# Patient Record
Sex: MALE | Race: WHITE | Employment: FULL TIME | URBAN - METROPOLITAN AREA
[De-identification: names, ages, dates, MRNs, and addresses within clinical notes are randomized per-mention and may not be internally consistent; named-entity substitution may affect disease eponyms.]

---

## 2018-05-15 ENCOUNTER — OFFICE VISIT (OUTPATIENT)
Dept: FAMILY MEDICINE | Facility: CLINIC | Age: 33
End: 2018-05-15
Payer: COMMERCIAL

## 2018-05-15 VITALS
BODY MASS INDEX: 22.33 KG/M2 | SYSTOLIC BLOOD PRESSURE: 126 MMHG | RESPIRATION RATE: 14 BRPM | DIASTOLIC BLOOD PRESSURE: 74 MMHG | HEART RATE: 79 BPM | HEIGHT: 70 IN | WEIGHT: 156 LBS | OXYGEN SATURATION: 99 % | TEMPERATURE: 97.8 F

## 2018-05-15 DIAGNOSIS — R36.1 BLOOD IN SEMEN: ICD-10-CM

## 2018-05-15 DIAGNOSIS — Z11.3 SCREEN FOR STD (SEXUALLY TRANSMITTED DISEASE): ICD-10-CM

## 2018-05-15 PROCEDURE — 36415 COLL VENOUS BLD VENIPUNCTURE: CPT | Performed by: FAMILY MEDICINE

## 2018-05-15 PROCEDURE — 87340 HEPATITIS B SURFACE AG IA: CPT | Performed by: FAMILY MEDICINE

## 2018-05-15 PROCEDURE — 99203 OFFICE O/P NEW LOW 30 MIN: CPT | Performed by: FAMILY MEDICINE

## 2018-05-15 PROCEDURE — 87591 N.GONORRHOEAE DNA AMP PROB: CPT | Performed by: FAMILY MEDICINE

## 2018-05-15 PROCEDURE — 86780 TREPONEMA PALLIDUM: CPT | Performed by: FAMILY MEDICINE

## 2018-05-15 PROCEDURE — 87491 CHLMYD TRACH DNA AMP PROBE: CPT | Performed by: FAMILY MEDICINE

## 2018-05-15 PROCEDURE — 87389 HIV-1 AG W/HIV-1&-2 AB AG IA: CPT | Performed by: FAMILY MEDICINE

## 2018-05-15 PROCEDURE — 86803 HEPATITIS C AB TEST: CPT | Performed by: FAMILY MEDICINE

## 2018-05-15 NOTE — PROGRESS NOTES
"  SUBJECTIVE:   Raul Dominguez is a 33 year old male who presents to clinic today for the following health issues:      Patient is here for STD screening. He noticed blood in his semen this morning. Pt normally uses condoms but not over this weekend. This weekend he was at a concert and had more alcohol and not lots of water. No urethral discharge, fever, chills or dysuria. No history of STD.         Problem list and histories reviewed & adjusted, as indicated.  Additional history: as documented    Labs reviewed in EPIC    Reviewed and updated as needed this visit by clinical staff       Reviewed and updated as needed this visit by Provider         ROS:  Constitutional, HEENT, cardiovascular, pulmonary, gi and gu systems are negative, except as otherwise noted.    OBJECTIVE:     /74  Pulse 79  Temp 97.8  F (36.6  C) (Oral)  Resp 14  Ht 5' 10.25\" (1.784 m)  Wt 156 lb (70.8 kg)  SpO2 99%  BMI 22.22 kg/m2  Body mass index is 22.22 kg/(m^2).  GENERAL: healthy, alert and no distress  EYES: Eyes grossly normal to inspection  HENT: nose and mouth without ulcers or lesions  RESP: non labored   MS: no gross musculoskeletal defects noted  SKIN: no suspicious lesions or rashes  PSYCH: mentation appears normal, affect normal    Diagnostic Test Results:  none     ASSESSMENT/PLAN:     1. Blood in semen  - ordered below chlamydia/gonorrhea   - no UA needed as symptoms not c/w UTI    2. Screen for STD (sexually transmitted disease)  - advised to avoid intercourse until results are back   - Treponema Abs w Reflex to RPR and Titer  - NEISSERIA GONORRHOEA PCR  - CHLAMYDIA TRACHOMATIS PCR  - Hepatitis B surface antigen  - HIV Antigen Antibody Combo  - **Hepatitis C Screen Reflex to RNA FUTURE anytime    Deqcompa Mcelroy MD  [facility]  "

## 2018-05-15 NOTE — MR AVS SNAPSHOT
"              After Visit Summary   5/15/2018    Raul Dominguez    MRN: [id number]           Patient Information     Date Of Birth          1985        Visit Information        Provider Department      5/15/2018 2:40 PM Mariano Mcelroy MD [facility]        Today's Diagnoses     Screen for STD (sexually transmitted disease)    -  1       Follow-ups after your visit        Who to contact     If you have questions or need follow up information about today's clinic visit or your schedule please contact [facility] directly at [phone].  Normal or non-critical lab and imaging results will be communicated to you by MyChart, letter or phone within 4 business days after the clinic has received the results. If you do not hear from us within 7 days, please contact the clinic through Business e via [place]hart or phone. If you have a critical or abnormal lab result, we will notify you by phone as soon as possible.  Submit refill requests through Pfenex or call your pharmacy and they will forward the refill request to us. Please allow 3 business days for your refill to be completed.          Additional Information About Your Visit        MyChart Information     Pfenex lets you send messages to your doctor, view your test results, renew your prescriptions, schedule appointments and more. To sign up, go to [email or web address] . Click on \"Log in\" on the left side of the screen, which will take you to the Welcome page. Then click on \"Sign up Now\" on the right side of the page.     You will be asked to enter the access code listed below, as well as some personal information. Please follow the directions to create your username and password.     Your access code is: [email or web address]  Expires: 2018  2:49 PM     Your access code will  in 90 days. If you need help or a new code, please call your [facility] or [phone].        Care EveryWhere ID     This is your Care EveryWhere ID. This " "could be used by other organizations to access your [place] medical records  [id number]        Your Vitals Were     Pulse Temperature Respirations Height Pulse Oximetry BMI (Body Mass Index)    79 97.8  F (36.6  C) (Oral) 14 5' 10.25\" (1.784 m) 99% 22.22 kg/m2       Blood Pressure from Last 3 Encounters:   05/15/18 126/74   03/02/12 110/70    Weight from Last 3 Encounters:   05/15/18 156 lb (70.8 kg)   03/02/12 151 lb (68.5 kg)              We Performed the Following     **Hepatitis C Screen Reflex to RNA FUTURE anytime     CHLAMYDIA TRACHOMATIS PCR     Hepatitis B surface antigen     HIV Antigen Antibody Combo     NEISSERIA GONORRHOEA PCR     Treponema Abs w Reflex to RPR and Titer        Primary Care Provider Office Phone # Fax #    Deqa Teresa Mcelroy [phone]       [address]  [facility] [place] [address]        Equal Access to Services     [facility]: Hadii aad ku hadasho Soomaali, waaxda luqadaha, qaybta kaalmada adeegyada, waxay idiin hayaan cadeneg lexusararobi la'vishnu . So [facility] [phone].    ATENCIÓN: Si habla español, tiene a max disposición servicios gratuitos de asistencia lingüística. [facility] [phone].    We comply with applicable federal civil rights laws and [place] laws. We do not discriminate on the basis of race, color, national origin, age, disability, sex, sexual orientation, or gender identity.            Thank you!     Thank you for choosing [facility]  for your care. Our goal is always to provide you with excellent care. Hearing back from our patients is one way we can continue to improve our services. Please take a few minutes to complete the written survey that you may receive in the mail after your visit with us. Thank you!             Your Updated Medication List - Protect others around you: Learn how to safely use, store and throw away your medicines at www.disposemymeds.org.          This list is accurate as of 5/15/18  2:49 PM.  Always use your " most recent med list.                   Brand Name Dispense Instructions for use Diagnosis    imiquimod 5 % cream    ALDARA    12 packet    Apply  topically three times a week. Apply at bedtime to warts or molluscum.   Wash off after 8 hours.   May use for up to 16 weeks.    Molluscum contagiosum

## 2018-05-16 LAB
C TRACH DNA SPEC QL NAA+PROBE: NEGATIVE
HBV SURFACE AG SERPL QL IA: NONREACTIVE
HCV AB SERPL QL IA: NONREACTIVE
HIV 1+2 AB+HIV1 P24 AG SERPL QL IA: NONREACTIVE
N GONORRHOEA DNA SPEC QL NAA+PROBE: NEGATIVE
SPECIMEN SOURCE: NORMAL
SPECIMEN SOURCE: NORMAL
T PALLIDUM AB SER QL: NONREACTIVE

## 2018-06-15 ENCOUNTER — TELEPHONE (OUTPATIENT)
Dept: FAMILY MEDICINE | Facility: CLINIC | Age: 33
End: 2018-06-15

## 2018-06-15 DIAGNOSIS — Z11.3 SCREEN FOR STD (SEXUALLY TRANSMITTED DISEASE): ICD-10-CM

## 2018-06-15 DIAGNOSIS — Z11.3 SCREEN FOR STD (SEXUALLY TRANSMITTED DISEASE): Primary | ICD-10-CM

## 2018-06-15 PROCEDURE — 86695 HERPES SIMPLEX TYPE 1 TEST: CPT | Performed by: FAMILY MEDICINE

## 2018-06-15 PROCEDURE — 36415 COLL VENOUS BLD VENIPUNCTURE: CPT | Performed by: FAMILY MEDICINE

## 2018-06-15 PROCEDURE — 86696 HERPES SIMPLEX TYPE 2 TEST: CPT | Performed by: FAMILY MEDICINE

## 2018-06-15 NOTE — TELEPHONE ENCOUNTER
ASSESSMENT / PLAN:  (Z11.3) Screen for STD (sexually transmitted disease)  (primary encounter diagnosis)  Comment: all other std's already checked  I ordered future labs, see below  Plan: Herpes Simplex Virus 1 and 2 IgG        Please let patient know he can come in for lab visit anytime, thanks!  Sincerely,  Dr. Sharda Tucker MD  6/15/2018

## 2018-06-15 NOTE — TELEPHONE ENCOUNTER
Called and discussed with patient. Scheduled in the lab for today.    Christina Michaud, JOSÉN, RN  [facility]

## 2018-06-15 NOTE — TELEPHONE ENCOUNTER
Reason for Call: Request for an order or referral:    Order or referral being requested: Lab- Syphilis and herpes    Date needed: as soon as possible *Pt would like to have labs drawn today if possible*    Has the patient been seen by the PCP for this problem? YES, see 5/15/18    Additional comments:     Phone number Patient can be reached at:  Home number on file [phone] (home)    Best Time:      Can we leave a detailed message on this number?  YES    Call taken on 6/15/2018 at 8:52 AM by Silke Farias

## 2018-06-15 NOTE — TELEPHONE ENCOUNTER
Routing to covering providers while Dr. Cook is out -- please review pt request.    Pt was here 5/15/18 for STD check. Pt would like to be checked for herpes. (He requested syphilis also but note that treponema was negative 5/15/18.) He is not symptomatic, and he has not had known exposure.    Please review and advise.    Thank you  Christina Michaud, JOSÉN, RN  [facility]

## 2018-06-18 LAB
HSV1 IGG SERPL QL IA: <0.2 AI (ref 0–0.8)
HSV2 IGG SERPL QL IA: <0.2 AI (ref 0–0.8)

## 2018-06-20 NOTE — PROGRESS NOTES
Great news, your screen for herpes was negative, you do not have this infection!    Sincerely,  Dr. Sharda Tucker MD  6/20/2018

## 2018-10-24 ENCOUNTER — TELEPHONE (OUTPATIENT)
Dept: PSYCHIATRY | Facility: CLINIC | Age: 33
End: 2018-10-24

## 2018-10-24 NOTE — TELEPHONE ENCOUNTER
PSYCHIATRY CLINIC PHONE INTAKE     SERVICES REQUESTED / INTERESTED IN          Med Management    Presenting Problem and Brief History                              What would you like to be seen for? (brief description):  Pt was taking zoloft but hasn;t tajen any medication for the last 10 years. When he called he was feeling a lot more anxiety and depression while he was going thorugh a divorcde. At this time he has two therapist and feels better. He used to have panic attacks when he was longer but has learned how to cope.   Have you received a mental health diagnosis? Yes   Which one (s): Mild Depression and anxiety  Is there any history of developmental delay?  Yes   Are you currently seeing a mental health provider?  Yes            Who / month last seen:  [facility]  Do you have mental health records elsewhere?  Yes  Will you sign a release so we can obtain them?  Yes    Have you ever been hospitalized for psychiatric reasons?  No  Describe:  NA    Do you have current thoughts of self-harm?  No    Do you currently have thoughts of harming others?  No       Substance Use History     Do you have any history of alcohol / illicit drug use?  No  Describe:  NA  Have you ever received treatment for this?  No    Describe:  NA     Social History     Does the patient have a guardian?  No    Name / number: NA  Have you had an ACT team in last 12 months?  No  Describe: NA   Do you have any current or past legal issues?  No  Describe: NA   OK to leave a detailed voicemail?  Yes    Medical/ Surgical History                                   Patient Active Problem List   Diagnosis     CARDIOVASCULAR SCREENING; LDL GOAL LESS THAN 160     Screen for STD (sexually transmitted disease)          Medications             Current Outpatient Prescriptions   Medication Sig Dispense Refill     imiquimod (ALDARA) 5 % cream Apply  topically three times a week. Apply at bedtime to warts or molluscum.   Wash off  after 8 hours.   May use for up to 16 weeks. (Patient not taking: Reported on 5/15/2018) 12 packet 3         DISPOSITION      10/24/18 Intake completed. Pt stated he feels better and doesn't need to see someone at this time but he wanted to complete the intake. The writer informed him that if he wants to do med management, to call back and give an updated intake so he can be reviewed for scheduling.   Preethi White,

## 2018-11-28 ENCOUNTER — OFFICE VISIT (OUTPATIENT)
Dept: FAMILY MEDICINE | Facility: CLINIC | Age: 33
End: 2018-11-28
Payer: COMMERCIAL

## 2018-11-28 ENCOUNTER — TELEPHONE (OUTPATIENT)
Dept: FAMILY MEDICINE | Facility: CLINIC | Age: 33
End: 2018-11-28

## 2018-11-28 VITALS
TEMPERATURE: 98.2 F | SYSTOLIC BLOOD PRESSURE: 118 MMHG | BODY MASS INDEX: 21.73 KG/M2 | HEART RATE: 73 BPM | DIASTOLIC BLOOD PRESSURE: 72 MMHG | WEIGHT: 152.5 LBS | OXYGEN SATURATION: 100 %

## 2018-11-28 DIAGNOSIS — Z01.818 PREOP GENERAL PHYSICAL EXAM: Primary | ICD-10-CM

## 2018-11-28 DIAGNOSIS — G56.23 CUBITAL TUNNEL SYNDROME, BILATERAL: ICD-10-CM

## 2018-11-28 PROCEDURE — 99214 OFFICE O/P EST MOD 30 MIN: CPT | Performed by: FAMILY MEDICINE

## 2018-11-28 NOTE — PROGRESS NOTES
[place] [id number] [place] [id number] [phone]  Dept: [phone]    PRE-OP EVALUATION:  Today's date: 2018    Raul Dominguez (: 1985) presents for pre-operative evaluation assessment as requested by Dr. Dr Xavier.  He requires evaluation and anesthesia risk assessment prior to undergoing surgery/procedure for treatment of cubital tunnel release of both hands  .    Fax number for surgical facility: [facility], [facility].     Primary Physician: Mariano Mcelroy  Type of Anesthesia Anticipated: unknown    Patient has a Health Care Directive or Living Will:  NO    Preop Questions 2018   Who is doing your surgery? Dr. Xavier   What are you having done? [facility]   Date of Surgery/Procedure: 2018 ulnar nerv both arms   Facility or Hospital where procedure/surgery will be performed: [place]   1.  Do you have a history of Heart attack, stroke, stent, coronary bypass surgery, or other heart surgery? No   2.  Do you ever have any pain or discomfort in your chest? No   3.  Do you have a history of  Heart Failure? No   4.   Are you troubled by shortness of breath when:  walking on a level surface, or up a slight hill, or at night? No   5.  Do you currently have a cold, bronchitis or other respiratory infection? No   6.  Do you have a cough, shortness of breath, or wheezing? No   7.  Do you sometimes get pains in the calves of your legs when you walk? No   8. Do you or anyone in your family have previous history of blood clots? No   9.  Do you or does anyone in your family have a serious bleeding problem such as prolonged bleeding following surgeries or cuts? No   10. Have you ever had problems with anemia or been told to take iron pills? No   11. Have you had any abnormal blood loss such as black, tarry or bloody stools? No   12. Have you ever had a blood transfusion? No   13. Have you or any of  your relatives ever had problems with anesthesia? No   14. Do you have sleep apnea, excessive snoring or daytime drowsiness? No   15. Do you have any prosthetic heart valves? No   16. Do you have prosthetic joints? No         HPI:     HPI related to upcoming procedure: bilateral cubital tunnel syndrome - numbness/tingling and pain in the 3&4th digits      See problem list for active medical problems.  Problems all longstanding and stable, except as noted/documented.  See ROS for pertinent symptoms related to these conditions.                                                                                                                                                          .    MEDICAL HISTORY:     Patient Active Problem List    Diagnosis Date Noted     Screen for STD (sexually transmitted disease) 06/15/2018     Priority: Medium     CARDIOVASCULAR SCREENING; LDL GOAL LESS THAN 160 03/03/2012     Priority: Medium      PMH - reviewed, no significant PMH  PSH - adenomectomy, left 2nd digit surgery    Medications - none   OTC products: None, except as noted above    Allergies   Allergen Reactions     Levaquin      Joint pn      Latex Allergy: NO    Social History   Substance Use Topics     Smoking status: Never Smoker     Smokeless tobacco: Never Used     Alcohol use Yes     History   Drug Use No       REVIEW OF SYSTEMS:   CONSTITUTIONAL: NEGATIVE for fever, chills, change in weight  INTEGUMENTARY/SKIN: NEGATIVE for worrisome rashes, moles or lesions  EYES: NEGATIVE for vision changes or irritation  ENT/MOUTH: NEGATIVE for ear, mouth and throat problems  RESP: NEGATIVE for significant cough or SOB  BREAST: NEGATIVE for masses, tenderness or discharge  CV: NEGATIVE for chest pain, palpitations or peripheral edema  GI: NEGATIVE for nausea, abdominal pain, heartburn, or change in bowel habits  : NEGATIVE for frequency, dysuria, or hematuria  MUSCULOSKELETAL: see above   NEURO: see above   ENDOCRINE: NEGATIVE for  temperature intolerance, skin/hair changes  HEME: NEGATIVE for bleeding problems  PSYCHIATRIC: NEGATIVE for changes in mood or affect    EXAM:   /72  Pulse 73  Temp 98.2  F (36.8  C) (Oral)  Wt 152 lb 8 oz (69.2 kg)  SpO2 100%  BMI 21.73 kg/m2    GENERAL APPEARANCE: healthy, alert and no distress     EYES: EOMI,  PERRL     HENT:  nose and mouth without ulcers or lesions     NECK: no adenopathy, no asymmetry, masses, or scars and thyroid normal to palpation     RESP: lungs clear to auscultation - no rales, rhonchi or wheezes     CV: regular rates and rhythm, normal S1 S2     ABDOMEN:  soft, nontender, no HSM or masses and bowel sounds normal     SKIN: no suspicious lesions or rashes     NEURO: Normal strength and tone, sensory exam grossly normal, mentation intact and speech normal     PSYCH: mentation appears normal. and affect normal/bright     LYMPHATICS: No cervical adenopathy    DIAGNOSTICS:   EKG: Not indicated due to non-vascular surgery and low risk of event (age <65 and without cardiac risk factors)  Hemoglobin (indicated for history of anemia or procedure with significant blood loss such as tonsillectomy, major intraperitoneal surgery, vascular surgery, major spine surgery, total joint replacement)  IMPRESSION:   Reason for surgery/procedure: cubital tunnel syndrome   Diagnosis/reason for consult: pre-op     The proposed surgical procedure is considered INTERMEDIATE risk.    REVISED CARDIAC RISK INDEX  The patient has the following serious cardiovascular risks for perioperative complications such as (MI, PE, VFib and 3  AV Block):  No serious cardiac risks  INTERPRETATION: 0 risks: Class I (very low risk - 0.4% complication rate)    The patient has the following additional risks for perioperative complications:  No identified additional risks      ICD-10-CM    1. Preop general physical exam Z01.818        RECOMMENDATIONS:     --Consult hospital rounder / IM to assist post-op medical management if  overnight     --Patient is to hold all medications on the day of surgery.    No Advil or Aleve 3 days before surgery.  No Aspirin 7 days before surgery.      APPROVAL GIVEN to proceed with proposed procedure, without further diagnostic evaluation       Signed Electronically by: Mariano Mcelroy MD    Copy of this evaluation report is provided to requesting physician.    [place] Preop Guidelines    Revised Cardiac Risk Index

## 2018-11-28 NOTE — PATIENT INSTRUCTIONS
No Advil or Aleve 3 days before surgery.  No Aspirin 7 days before surgery.  Hold all medications on the day of the surgery.         Before Your Surgery      Call your surgeon if there is any change in your health. This includes signs of a cold or flu (such as a sore throat, runny nose, cough, rash or fever).    Do not smoke, drink alcohol or take over the counter medicine (unless your surgeon or primary care doctor tells you to) for the 24 hours before and after surgery.    If you take prescribed drugs: Follow your doctor s orders about which medicines to take and which to stop until after surgery.    Eating and drinking prior to surgery: follow the instructions from your surgeon    Take a shower or bath the night before surgery. Use the soap your surgeon gave you to gently clean your skin. If you do not have soap from your surgeon, use your regular soap. Do not shave or scrub the surgery site.  Wear clean pajamas and have clean sheets on your bed.

## 2018-11-28 NOTE — MR AVS SNAPSHOT
After Visit Summary   11/28/2018    Raul Dominguez    MRN: [id number]           Patient Information     Date Of Birth          1985        Visit Information        Provider Department      11/28/2018 11:40 AM Mariano Mcelroy MD [facility]        Today's Diagnoses     Preop general physical exam    -  1    Cubital tunnel syndrome, bilateral          Care Instructions    No Advil or Aleve 3 days before surgery.  No Aspirin 7 days before surgery.  Hold all medications on the day of the surgery.         Before Your Surgery      Call your surgeon if there is any change in your health. This includes signs of a cold or flu (such as a sore throat, runny nose, cough, rash or fever).    Do not smoke, drink alcohol or take over the counter medicine (unless your surgeon or primary care doctor tells you to) for the 24 hours before and after surgery.    If you take prescribed drugs: Follow your doctor s orders about which medicines to take and which to stop until after surgery.    Eating and drinking prior to surgery: follow the instructions from your surgeon    Take a shower or bath the night before surgery. Use the soap your surgeon gave you to gently clean your skin. If you do not have soap from your surgeon, use your regular soap. Do not shave or scrub the surgery site.  Wear clean pajamas and have clean sheets on your bed.           Follow-ups after your visit        Who to contact     If you have questions or need follow up information about today's clinic visit or your schedule please contact [facility] directly at [phone].  Normal or non-critical lab and imaging results will be communicated to you by MyChart, letter or phone within 4 business days after the clinic has received the results. If you do not hear from us within 7 days, please contact the clinic through MyChart or phone. If you have a critical or abnormal lab result, we will notify you by phone as  soon as possible.  Submit refill requests through Kofax or call your pharmacy and they will forward the refill request to us. Please allow 3 business days for your refill to be completed.          Additional Information About Your Visit        [email or web address] Information     Kofax gives you secure access to your electronic health record. If you see a primary care provider, you can also send messages to your care team and make appointments. If you have questions, please call your primary care clinic.  If you do not have a primary care provider, please call [phone] and they will assist you.        Care EveryWhere ID     This is your Care EveryWhere ID. This could be used by other organizations to access your [place] medical records  [id number]        Your Vitals Were     Pulse Temperature Pulse Oximetry BMI (Body Mass Index)          73 98.2  F (36.8  C) (Oral) 100% 21.73 kg/m2         Blood Pressure from Last 3 Encounters:   11/28/18 118/72   05/15/18 126/74   03/02/12 110/70    Weight from Last 3 Encounters:   11/28/18 152 lb 8 oz (69.2 kg)   05/15/18 156 lb (70.8 kg)   03/02/12 151 lb (68.5 kg)              Today, you had the following     No orders found for display         Today's Medication Changes          These changes are accurate as of 11/28/18 12:03 PM.  If you have any questions, ask your nurse or doctor.               Stop taking these medicines if you haven't already. Please contact your care team if you have questions.     imiquimod 5 % external cream   Commonly known as:  ALDARA   Stopped by:  Mariano Mcelroy MD                    Primary Care Provider Office Phone # Fax #    Mariano Mcelroy [phone] [address]  [facility] [address]        Equal Access to Services     JAYLEEN RINALDI : Felicity Burnham, penny arboleda, elisa tafoya. So [facility] [phone].    ATENCIÓN: Si kate myrick  a max disposición servicios gratuitos de asistencia lingüística. Tenzin corral [phone].    We comply with applicable federal civil rights laws and [place] laws. We do not discriminate on the basis of race, color, national origin, age, disability, sex, sexual orientation, or gender identity.            Thank you!     Thank you for choosing [facility]  for your care. Our goal is always to provide you with excellent care. Hearing back from our patients is one way we can continue to improve our services. Please take a few minutes to complete the written survey that you may receive in the mail after your visit with us. Thank you!             Your Updated Medication List - Protect others around you: Learn how to safely use, store and throw away your medicines at www.disposemymeds.org.      Notice  As of 11/28/2018 12:03 PM    You have not been prescribed any medications.

## 2019-11-06 ENCOUNTER — HEALTH MAINTENANCE LETTER (OUTPATIENT)
Age: 34
End: 2019-11-06

## 2019-11-18 ENCOUNTER — OFFICE VISIT (OUTPATIENT)
Dept: FAMILY MEDICINE | Facility: CLINIC | Age: 34
End: 2019-11-18
Payer: COMMERCIAL

## 2019-11-18 ENCOUNTER — ANCILLARY PROCEDURE (OUTPATIENT)
Dept: GENERAL RADIOLOGY | Facility: CLINIC | Age: 34
End: 2019-11-18
Attending: NURSE PRACTITIONER
Payer: COMMERCIAL

## 2019-11-18 ENCOUNTER — ANCILLARY PROCEDURE (OUTPATIENT)
Dept: CT IMAGING | Facility: CLINIC | Age: 34
End: 2019-11-18
Attending: NURSE PRACTITIONER
Payer: COMMERCIAL

## 2019-11-18 DIAGNOSIS — S09.90XA INJURY OF HEAD, INITIAL ENCOUNTER: ICD-10-CM

## 2019-11-18 DIAGNOSIS — S09.90XA INJURY OF HEAD, INITIAL ENCOUNTER: Primary | ICD-10-CM

## 2019-11-18 DIAGNOSIS — M25.531 RIGHT WRIST PAIN: ICD-10-CM

## 2019-11-18 DIAGNOSIS — S06.0X9A CONCUSSION WITH LOSS OF CONSCIOUSNESS, INITIAL ENCOUNTER: ICD-10-CM

## 2019-11-18 PROCEDURE — 73110 X-RAY EXAM OF WRIST: CPT | Mod: RT

## 2019-11-18 PROCEDURE — 99214 OFFICE O/P EST MOD 30 MIN: CPT | Performed by: NURSE PRACTITIONER

## 2019-11-18 ASSESSMENT — MIFFLIN-ST. JEOR: SCORE: 1696.96
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
## 2019-11-18 NOTE — LETTER
November 18, 2019      Raul STINSON Angelica  [address]  [facility] [address]        To Whom It May Concern,      Raul was seen in clinic today.  He is unable to work for at least the next week.  He will be re-evaluated in one week.           Sincerely,        Radha Armstrong, DNP, CNP

## 2019-11-25 ENCOUNTER — OFFICE VISIT (OUTPATIENT)
Dept: FAMILY MEDICINE | Facility: CLINIC | Age: 34
End: 2019-11-25
Payer: COMMERCIAL

## 2019-11-25 DIAGNOSIS — S09.90XD INJURY OF HEAD, SUBSEQUENT ENCOUNTER: Primary | ICD-10-CM

## 2019-11-25 DIAGNOSIS — S06.0X9D CONCUSSION WITH LOSS OF CONSCIOUSNESS, SUBSEQUENT ENCOUNTER: ICD-10-CM

## 2019-11-25 PROCEDURE — 99213 OFFICE O/P EST LOW 20 MIN: CPT | Performed by: NURSE PRACTITIONER

## 2019-11-25 ASSESSMENT — PATIENT HEALTH QUESTIONNAIRE - PHQ9
SUM OF ALL RESPONSES TO PHQ QUESTIONS 1-9: 13
5. POOR APPETITE OR OVEREATING: MORE THAN HALF THE DAYS

## 2019-11-25 ASSESSMENT — ANXIETY QUESTIONNAIRES
1. FEELING NERVOUS, ANXIOUS, OR ON EDGE: MORE THAN HALF THE DAYS
7. FEELING AFRAID AS IF SOMETHING AWFUL MIGHT HAPPEN: SEVERAL DAYS
GAD7 TOTAL SCORE: 11
3. WORRYING TOO MUCH ABOUT DIFFERENT THINGS: MORE THAN HALF THE DAYS
IF YOU CHECKED OFF ANY PROBLEMS ON THIS QUESTIONNAIRE, HOW DIFFICULT HAVE THESE PROBLEMS MADE IT FOR YOU TO DO YOUR WORK, TAKE CARE OF THINGS AT HOME, OR GET ALONG WITH OTHER PEOPLE: SOMEWHAT DIFFICULT
6. BECOMING EASILY ANNOYED OR IRRITABLE: MORE THAN HALF THE DAYS
5. BEING SO RESTLESS THAT IT IS HARD TO SIT STILL: SEVERAL DAYS
2. NOT BEING ABLE TO STOP OR CONTROL WORRYING: SEVERAL DAYS

## 2019-11-25 ASSESSMENT — MIFFLIN-ST. JEOR: SCORE: 1696.96
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
## 2019-11-25 NOTE — LETTER
November 25, 2019      Raul STINSON Angelica  [address]  [facility] [facility] [address]        To Whom It May Concern,      Raul was seen in clinic today.  He is unable to work for at least the next two weeks.  He will be re-evaluated on 12/9/19.          Sincerely,        Radha Armstrong, JERRELL, CNP

## 2019-11-26 ASSESSMENT — ANXIETY QUESTIONNAIRES: GAD7 TOTAL SCORE: 11

## 2019-12-09 ENCOUNTER — OFFICE VISIT (OUTPATIENT)
Dept: FAMILY MEDICINE | Facility: CLINIC | Age: 34
End: 2019-12-09
Payer: COMMERCIAL

## 2019-12-09 VITALS
WEIGHT: 164 LBS | OXYGEN SATURATION: 98 % | HEART RATE: 63 BPM | SYSTOLIC BLOOD PRESSURE: 105 MMHG | BODY MASS INDEX: 23.36 KG/M2 | DIASTOLIC BLOOD PRESSURE: 64 MMHG | RESPIRATION RATE: 18 BRPM | TEMPERATURE: 97.7 F

## 2019-12-09 DIAGNOSIS — S06.0X9D CONCUSSION WITH LOSS OF CONSCIOUSNESS, SUBSEQUENT ENCOUNTER: Primary | ICD-10-CM

## 2019-12-09 PROCEDURE — 99213 OFFICE O/P EST LOW 20 MIN: CPT | Performed by: NURSE PRACTITIONER

## 2019-12-09 NOTE — LETTER
December 9, 2019      Raul STINSON Angelica  [address]  [place] [facility] [address]        To Whom It May Concern,      Raul was seen in clinic today.  He is able to return to work on 12/10, working 4 hours a day until 12/13 and may return to work without any restrictions on 12/16/19.          Sincerely,        Radha Armstrong, DNP, CNP
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
## 2019-12-10 ENCOUNTER — TELEPHONE (OUTPATIENT)
Dept: FAMILY MEDICINE | Facility: CLINIC | Age: 34
End: 2019-12-10

## 2019-12-11 NOTE — TELEPHONE ENCOUNTER
Completed by PCP.   Called pt: okay to fax to [employer], pt requesting copy emailed to him, also made a copy for abstraction.    Lianet Hendricks MA

## 2019-12-20 ENCOUNTER — TELEPHONE (OUTPATIENT)
Dept: FAMILY MEDICINE | Facility: CLINIC | Age: 34
End: 2019-12-20

## 2019-12-26 NOTE — TELEPHONE ENCOUNTER
Form faxed. Placed original in abstract, made a copy and placed in provider's faxed bin and mailed a copy to patient's address.    Tiana Beckman MA

## 2019-12-27 ENCOUNTER — TELEPHONE (OUTPATIENT)
Dept: FAMILY MEDICINE | Facility: CLINIC | Age: 34
End: 2019-12-27

## 2019-12-27 NOTE — TELEPHONE ENCOUNTER
Received FMLA form via fax. Form that was faxed in is the same form from the previous one that Radha Armstrong filled out. Emailed form to patient.      Tiana Beckman MA

## 2020-01-23 ENCOUNTER — MYC MEDICAL ADVICE (OUTPATIENT)
Dept: FAMILY MEDICINE | Facility: CLINIC | Age: 35
End: 2020-01-23

## 2020-01-24 NOTE — TELEPHONE ENCOUNTER
Printed form and placed in provider's records form to review.         Arielle RENNER     [facility]

## 2020-01-27 NOTE — TELEPHONE ENCOUNTER
It appears they are wanting records - they previously requested and never received them.  Please send ASAP.

## 2020-01-28 ENCOUNTER — DOCUMENTATION ONLY (OUTPATIENT)
Dept: FAMILY MEDICINE | Facility: CLINIC | Age: 35
End: 2020-01-28

## 2020-01-28 NOTE — PROGRESS NOTES
Reason for call:  Form   Our goal is to have forms completed within 72 hours, however some forms may require a visit or additional information.     Who is the form from? Patient  Where did the form come from? form was faxed in  What clinic location was the form placed at?   Where was the form placed? BOX Box/Folder  What number is listed as a contact on the form? [phone]    Phone call message - patient request for a letter, form or note:     Date needed: as soon as possible  Please call the patient when completed  Has the patient signed a consent form for release of information? Not Applicable    Additional comments:     Type of letter, form or note: [facility]    Phone number to reach patient:  Cell number on file:    Telephone Information:   Mobile [phone]       Best Time:      Can we leave a detailed message on this number?

## 2020-01-29 NOTE — PROGRESS NOTES
Stamped, made copy for chart, and emailed patient back.    Gave patient contact information for medical records and attached JAYE.     Lianet Hendricks MA

## 2020-11-29 ENCOUNTER — HEALTH MAINTENANCE LETTER (OUTPATIENT)
Age: 35
End: 2020-11-29

## 2020-12-20 ENCOUNTER — APPOINTMENT (OUTPATIENT)
Dept: GENERAL RADIOLOGY | Facility: CLINIC | Age: 35
End: 2020-12-20
Attending: INTERNAL MEDICINE
Payer: COMMERCIAL

## 2020-12-20 ENCOUNTER — HOSPITAL ENCOUNTER (EMERGENCY)
Facility: CLINIC | Age: 35
Discharge: HOME | End: 2020-12-20
Attending: INTERNAL MEDICINE | Admitting: INTERNAL MEDICINE
Payer: COMMERCIAL

## 2020-12-20 VITALS
SYSTOLIC BLOOD PRESSURE: 129 MMHG | RESPIRATION RATE: 18 BRPM | HEART RATE: 78 BPM | TEMPERATURE: 98 F | OXYGEN SATURATION: 100 % | DIASTOLIC BLOOD PRESSURE: 71 MMHG

## 2020-12-20 DIAGNOSIS — S93.601A SPRAIN OF RIGHT FOOT, INITIAL ENCOUNTER: ICD-10-CM

## 2020-12-20 DIAGNOSIS — S29.011A MUSCLE STRAIN OF CHEST WALL, INITIAL ENCOUNTER: ICD-10-CM
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
## 2020-12-20 PROCEDURE — 99284 EMERGENCY DEPT VISIT MOD MDM: CPT | Performed by: INTERNAL MEDICINE

## 2020-12-20 PROCEDURE — 99284 EMERGENCY DEPT VISIT MOD MDM: CPT | Mod: 25 | Performed by: INTERNAL MEDICINE

## 2020-12-20 PROCEDURE — 71046 X-RAY EXAM CHEST 2 VIEWS: CPT

## 2020-12-20 PROCEDURE — 73630 X-RAY EXAM OF FOOT: CPT | Mod: LT

## 2020-12-20 RX ORDER — NAPROXEN 500 MG/1
500 TABLET ORAL 2 TIMES DAILY PRN
Qty: 30 TABLET | Refills: 0 | Status: SHIPPED | OUTPATIENT
Start: 2020-12-20 | End: 2021-12-23

## 2020-12-20 RX ORDER — CYCLOBENZAPRINE HCL 5 MG
5 TABLET ORAL 3 TIMES DAILY PRN
Qty: 20 TABLET | Refills: 0 | Status: SHIPPED | OUTPATIENT
Start: 2020-12-20 | End: 2021-12-23

## 2020-12-20 ASSESSMENT — ENCOUNTER SYMPTOMS
ABDOMINAL PAIN: 0
ARTHRALGIAS: 0
CONFUSION: 0
NECK PAIN: 0
NECK STIFFNESS: 0
COLOR CHANGE: 0
DIFFICULTY URINATING: 0
FEVER: 0
EYE REDNESS: 0
NUMBNESS: 1
HEADACHES: 0
SHORTNESS OF BREATH: 0
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
## 2020-12-20 NOTE — ED AVS SNAPSHOT
[facility] Emergency Department  [address]  [place] [address]  Phone: [phone]  Fax: [phone]                                    Raul Dominguez   MRN: [id number]    Department: [facility] Emergency Department   Date of Visit: 12/20/2020           After Visit Summary Signature Page    I have received my discharge instructions, and my questions have been answered. I have discussed any challenges I see with this plan with the nurse or doctor.    ..........................................................................................................................................  Patient/Patient Representative Signature      ..........................................................................................................................................  Patient Representative Print Name and Relationship to Patient    ..................................................               ................................................  Date                                   Time    ..........................................................................................................................................  Reviewed by Signature/Title    ...................................................              ..............................................  Date                                               Time          22EPIC Rev 08/18
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
## 2021-09-19 ENCOUNTER — HEALTH MAINTENANCE LETTER (OUTPATIENT)
Age: 36
End: 2021-09-19

## 2021-12-23 ENCOUNTER — OFFICE VISIT (OUTPATIENT)
Dept: FAMILY MEDICINE | Facility: CLINIC | Age: 36
End: 2021-12-23
Payer: COMMERCIAL

## 2021-12-23 VITALS
WEIGHT: 173 LBS | BODY MASS INDEX: 24.22 KG/M2 | HEART RATE: 83 BPM | DIASTOLIC BLOOD PRESSURE: 83 MMHG | SYSTOLIC BLOOD PRESSURE: 128 MMHG | TEMPERATURE: 99.3 F | HEIGHT: 71 IN | OXYGEN SATURATION: 99 %

## 2021-12-23 DIAGNOSIS — F41.9 ANXIETY AND DEPRESSION: ICD-10-CM

## 2021-12-23 DIAGNOSIS — Z01.818 PREOP GENERAL PHYSICAL EXAM: Primary | ICD-10-CM

## 2021-12-23 DIAGNOSIS — F32.A ANXIETY AND DEPRESSION: ICD-10-CM

## 2021-12-23 DIAGNOSIS — G56.21 ULNAR TUNNEL SYNDROME OF RIGHT WRIST: ICD-10-CM

## 2021-12-23 LAB
ERYTHROCYTE [DISTWIDTH] IN BLOOD BY AUTOMATED COUNT: 12.2 % (ref 10–15)
HCT VFR BLD AUTO: 44.7 % (ref 40–53)
HGB BLD-MCNC: 15.3 G/DL (ref 13.3–17.7)
MCH RBC QN AUTO: 32.9 PG (ref 26.5–33)
MCHC RBC AUTO-ENTMCNC: 34.2 G/DL (ref 31.5–36.5)
MCV RBC AUTO: 96 FL (ref 78–100)
PLATELET # BLD AUTO: 260 10E3/UL (ref 150–450)
RBC # BLD AUTO: 4.65 10E6/UL (ref 4.4–5.9)
WBC # BLD AUTO: 6.3 10E3/UL (ref 4–11)

## 2021-12-23 PROCEDURE — 85027 COMPLETE CBC AUTOMATED: CPT | Performed by: PHYSICIAN ASSISTANT

## 2021-12-23 PROCEDURE — 80048 BASIC METABOLIC PNL TOTAL CA: CPT | Performed by: PHYSICIAN ASSISTANT

## 2021-12-23 PROCEDURE — 36415 COLL VENOUS BLD VENIPUNCTURE: CPT | Performed by: PHYSICIAN ASSISTANT

## 2021-12-23 PROCEDURE — 99214 OFFICE O/P EST MOD 30 MIN: CPT | Performed by: PHYSICIAN ASSISTANT

## 2021-12-23 ASSESSMENT — MIFFLIN-ST. JEOR: SCORE: 1736.85

## 2021-12-23 ASSESSMENT — PAIN SCALES - GENERAL: PAINLEVEL: NO PAIN (0)

## 2021-12-23 NOTE — RESULT ENCOUNTER NOTE
Dear Raul,     Here are your recent results which are within the expected range. Please continue with your current plan of care and let us know if you have any questions or concerns.    Regards,  Ronna Sands PA-C

## 2021-12-23 NOTE — PATIENT INSTRUCTIONS
Stop all NSAIDs (motrin, ibuprofen, naproxen, aleve, advil, naprosyn, aspirin)  for at least 5 days prior to surgery.    Stop Elverta's wort 5 days prior to surgery.    Okay to take medical cannabis the evening prior to surgery.

## 2021-12-23 NOTE — PROGRESS NOTES
[address], [address]  [facility] [address]  Phone: [phone]  Primary Provider: Mariano Mcelroy  Pre-op Performing Provider: ROCKY ULRICH      PREOPERATIVE EVALUATION:  Today's date: 12/23/2021    Raul Dominguez is a 36 year old male who presents for a preoperative evaluation.    Surgical Information:  Surgery/Procedure: Right ulnar orbital tunnel release  Surgery Location: [facility]  Surgeon: Edgar  Surgery Date: 12/29/2021  Time of Surgery: 2:00 PM  Where patient plans to recover: At home with family  Fax number for surgical facility: [phone]    Type of Anesthesia Anticipated: Local with MAC    Assessment & Plan     The proposed surgical procedure is considered LOW risk.    Assessment and Plan:     (Z01.818) Preop general physical exam  (primary encounter diagnosis)  Comment: able to do 4 METs easily  Plan: Basic metabolic panel  (Ca, Cl, CO2, Creat,         Gluc, K, Na, BUN), CBC with platelets  Cleared for surgery    (G56.21) Ulnar tunnel syndrome of right wrist  Comment: numbness and tingling x years  Plan: per ortho    (F41.9,  F32.A) Anxiety and depression  Comment: stable on Toole wort and medical cannabis  Plan: hold St. J wort x 5 days prior to surgery    Risks and Recommendations:  The patient has the following additional risks and recommendations for perioperative complications:   - No identified additional risk factors other than previously addressed    Medication Instructions:  Stop all NSAIDs (motrin, ibuprofen, naproxen, aleve, advil, naprosyn, aspirin)  for at least 5 days prior to surgery.    Stop David's wort 5 days prior to surgery.    Okay to take medical cannabis the evening prior to surgery.    RECOMMENDATION:  APPROVAL GIVEN to proceed with proposed procedure, without further diagnostic evaluation.    Rocky Sands PA-C      Subjective     HPI related to upcoming procedure:   Will have  right ulnar tunnel release on 12/27/21 with Dr. Hernández.    Has had right hand symptoms x years: numbness/tingling.    He otherwise feels well.    He exercises regularly--lifts twice a week, hike and bikes 2-3 x per week.  No chest pain or sob with exercise    He denies cardiac history.    He also denies fever/chills, cough, congestion, chest pain, sob, abdominal pain, nausea/vomiting, diarrhea, black/bloody stool, dysuria, dizziness, headache.      Preop Questions 12/23/2021   1. Have you ever had a heart attack or stroke? No   2. Have you ever had surgery on your heart or blood vessels, such as a stent placement, a coronary artery bypass, or surgery on an artery in your head, neck, heart, or legs? No   3. Do you have chest pain with activity? No   4. Do you have a history of  heart failure? No   5. Do you currently have a cold, bronchitis or symptoms of other infection? No   6. Do you have a cough, shortness of breath, or wheezing? No   7. Do you or anyone in your family have previous history of blood clots? No   8. Do you or does anyone in your family have a serious bleeding problem such as prolonged bleeding following surgeries or cuts? No   9. Have you ever had problems with anemia or been told to take iron pills? No   10. Have you had any abnormal blood loss such as black, tarry or bloody stools? No   11. Have you ever had a blood transfusion? No   12. Are you willing to have a blood transfusion if it is medically needed before, during, or after your surgery? Yes   13. Have you or any of your relatives ever had problems with anesthesia? No   14. Do you have sleep apnea, excessive snoring or daytime drowsiness? No   15. Do you have any artifical heart valves or other implanted medical devices like a pacemaker, defibrillator, or continuous glucose monitor? No   16. Do you have artificial joints? No   17. Are you allergic to latex? No     Health Care Directive:  Patient does not have a Health Care Directive or  "Living Will: Discussed advance care planning with patient; information given to patient to review.      Review of Systems  Constitutional, neuro, ENT, endocrine, pulmonary, cardiac, gastrointestinal, genitourinary, musculoskeletal, integument and psychiatric systems are negative, except as otherwise noted.    Patient Active Problem List    Diagnosis Date Noted     Screen for STD (sexually transmitted disease) 06/15/2018     Priority: Medium     CARDIOVASCULAR SCREENING; LDL GOAL LESS THAN 160 03/03/2012     Priority: Medium      No past medical history on file.  No past surgical history on file.  Current Outpatient Medications   Medication Sig Dispense Refill     medical cannabis (Patient's own supply) See Admin Instructions (The purpose of this order is to document that the patient reports taking medical cannabis.  This is not a prescription, and is not used to certify that the patient has a qualifying medical condition.)       ST JOHNS WORT PO Take 600 mg by mouth 3 times daily         Allergies   Allergen Reactions     Levaquin      Joint pn        Social History     Tobacco Use     Smoking status: Never Smoker     Smokeless tobacco: Never Used   Substance Use Topics     Alcohol use: Not Currently     Family History   Problem Relation Age of Onset     No Known Problems Mother      No Known Problems Father      History   Drug Use No         Objective     /83 (BP Location: Left arm, Cuff Size: Adult Regular)   Pulse 83   Temp 99.3  F (37.4  C) (Temporal)   Ht 1.803 m (5' 11\")   Wt 78.5 kg (173 lb)   SpO2 99%   BMI 24.13 kg/m      Physical Exam    GENERAL APPEARANCE: healthy, alert and no distress     EYES: no scleral icterus     HENT: OP clear mouth without ulcers or lesions     RESP: lungs clear to auscultation - no rales, rhonchi or wheezes     CV: regular rates and rhythm, normal S1 S2, no S3 or S4 and no murmur, click or rub     ABDOMEN:  soft, nontender, no HSM or masses and bowel sounds normal     " MS: extremities normal- no gross deformities noted, no edema     NEURO: Normal mentation, gait and speechnormal     PSYCH: mentation appears normal. and affect normal/bright      Diagnostics:  EKG not indicated: no known coronary heart disease, significant arrhythmia, peripheral arterial disease or other structural heart disease.  BMP and CBC pending      Revised Cardiac Risk Index (RCRI):  The patient has the following serious cardiovascular risks for perioperative complications:   - No serious cardiac risks = 0 points     RCRI Interpretation: 0 points: Class I (very low risk - 0.4% complication rate)      Signed Electronically by: Ronna Sands PA-C  Copy of this evaluation report is provided to requesting physician.

## 2021-12-24 LAB
ANION GAP SERPL CALCULATED.3IONS-SCNC: 6 MMOL/L (ref 3–14)
BUN SERPL-MCNC: 11 MG/DL (ref 7–30)
CALCIUM SERPL-MCNC: 9.2 MG/DL (ref 8.5–10.1)
CHLORIDE BLD-SCNC: 102 MMOL/L (ref 94–109)
CO2 SERPL-SCNC: 30 MMOL/L (ref 20–32)
CREAT SERPL-MCNC: 1 MG/DL (ref 0.66–1.25)
GFR SERPL CREATININE-BSD FRML MDRD: >90 ML/MIN/1.73M2
GLUCOSE BLD-MCNC: 85 MG/DL (ref 70–99)
POTASSIUM BLD-SCNC: 4.4 MMOL/L (ref 3.4–5.3)
SODIUM SERPL-SCNC: 138 MMOL/L (ref 133–144)

## 2022-01-09 ENCOUNTER — HEALTH MAINTENANCE LETTER (OUTPATIENT)
Age: 37
End: 2022-01-09

## 2022-11-21 ENCOUNTER — HEALTH MAINTENANCE LETTER (OUTPATIENT)
Age: 37
End: 2022-11-21

## 2023-04-16 ENCOUNTER — HEALTH MAINTENANCE LETTER (OUTPATIENT)
Age: 38
End: 2023-04-16

## 2024-06-23 ENCOUNTER — HEALTH MAINTENANCE LETTER (OUTPATIENT)
Age: 39
End: 2024-06-23